# Patient Record
Sex: FEMALE | Employment: FULL TIME | URBAN - METROPOLITAN AREA
[De-identification: names, ages, dates, MRNs, and addresses within clinical notes are randomized per-mention and may not be internally consistent; named-entity substitution may affect disease eponyms.]

---

## 2019-12-10 ENCOUNTER — OFFICE VISIT (OUTPATIENT)
Dept: INTERNAL MEDICINE CLINIC | Facility: CLINIC | Age: 49
End: 2019-12-10
Payer: COMMERCIAL

## 2019-12-10 VITALS
HEART RATE: 72 BPM | DIASTOLIC BLOOD PRESSURE: 68 MMHG | SYSTOLIC BLOOD PRESSURE: 104 MMHG | HEIGHT: 64.75 IN | BODY MASS INDEX: 30.61 KG/M2 | TEMPERATURE: 98 F | RESPIRATION RATE: 12 BRPM | WEIGHT: 181.5 LBS

## 2019-12-10 DIAGNOSIS — R26.89 BALANCE PROBLEM: Primary | ICD-10-CM

## 2019-12-10 DIAGNOSIS — R09.82 POST-NASAL DRIP: ICD-10-CM

## 2019-12-10 DIAGNOSIS — R51.9 HEADACHE, UNSPECIFIED HEADACHE TYPE: ICD-10-CM

## 2019-12-10 DIAGNOSIS — Z00.00 PHYSICAL EXAM, ANNUAL: ICD-10-CM

## 2019-12-10 PROCEDURE — 99204 OFFICE O/P NEW MOD 45 MIN: CPT | Performed by: INTERNAL MEDICINE

## 2019-12-12 ENCOUNTER — TELEPHONE (OUTPATIENT)
Dept: INTERNAL MEDICINE CLINIC | Facility: CLINIC | Age: 49
End: 2019-12-12

## 2019-12-12 DIAGNOSIS — R71.8 LOW MEAN CORPUSCULAR VOLUME (MCV): ICD-10-CM

## 2019-12-12 DIAGNOSIS — D72.829 LEUKOCYTOSIS, UNSPECIFIED TYPE: Primary | ICD-10-CM

## 2019-12-12 NOTE — TELEPHONE ENCOUNTER
Received lab results from [facility] for patient. External result console updated, results placed for Dr. Mikal Finnegan review.

## 2019-12-12 NOTE — TELEPHONE ENCOUNTER
Patient returned call, discussed labs per Dr. George Miller note below. Patient reports feeling slightly better, still with post-nasal drip, but is no longer sneezing or coughing. Patient reports all drainage as clear and denies fever.  Patient advised if she star

## 2019-12-12 NOTE — TELEPHONE ENCOUNTER
Results reviewed. Wbc elevated with neutrophil predominence. Unclear etiology. At visit she did state she was getting over a sinus cold, could be due to that. Chronic sinusitis? Repeat cbc with diff in 1 week.  If any fever or any worsening sinus sympto

## 2019-12-18 NOTE — TELEPHONE ENCOUNTER
Duplicate labs received, dated 12/12/19. Confirmed these have already been reviewed and entered in external results console.

## 2019-12-23 ENCOUNTER — TELEPHONE (OUTPATIENT)
Dept: INTERNAL MEDICINE CLINIC | Facility: CLINIC | Age: 49
End: 2019-12-23

## 2019-12-23 DIAGNOSIS — R71.8 LOW MEAN CORPUSCULAR VOLUME (MCV): ICD-10-CM

## 2019-12-23 DIAGNOSIS — E61.1 LOW IRON: Primary | ICD-10-CM

## 2019-12-23 LAB
FERRITIN: 38 (ref 15–150)
HCT: 37.2 (ref 34–46.6)
HGB: 11.5 (ref 11.1–15.9)
IRON BIND.CAP.(TIBC): 309 UG/DL (ref 250–450)
IRON SATURATION: 24 % (ref 15–55)
IRON: 73 (ref 27–159)
MEAN CELL VOLUME: 76 (ref 79–97)
MEAN CORPUSCULAR HEMOGLOBIN: 23.6 (ref 26.6–33)
MEAN CORPUSCULAR HGB CONC: 30.9 (ref 31.5–35.7)
PLT: 373 (ref 150–450)
RED BLOOD COUNT: 4.87 (ref 3.77–5.28)
RED CELL DISTRIBUTION WIDTH: 17.3 (ref 12.3–15.4)
UIBC: 236 (ref 131–425)
WBC: 10.2 (ref 3.4–10.8)

## 2019-12-23 NOTE — TELEPHONE ENCOUNTER
Received CBC, Iron/TIBC, and ferritin results from [facility]. External results console updated. Ordered by Dr. Rodo Morin to be repeated 1 week from 12/12/19 per TE dated 12/12/19. Placed for Dr. Rock Fuller review as coverage for Dr. Rodo Morin.

## 2019-12-30 NOTE — TELEPHONE ENCOUNTER
Called to speak to patient, unable to leave detailed message per HIPAA. Left message requesting patient call back with holiday office hours.      Need to determine which lab patient would like to have labs drawn: preferred lab listed as [facility], received mos

## 2019-12-30 NOTE — TELEPHONE ENCOUNTER
Reviewed results. Will discuss in detail at her visit but her iron levels are not low. Will need hemoglobin electrophoresis. Please order and inform pt. She should do it this week so we can have results in time for her appt.

## 2020-01-07 ENCOUNTER — LAB ENCOUNTER (OUTPATIENT)
Dept: LAB | Age: 50
End: 2020-01-07
Attending: INTERNAL MEDICINE
Payer: COMMERCIAL

## 2020-01-07 DIAGNOSIS — R71.8 LOW MEAN CORPUSCULAR VOLUME (MCV): ICD-10-CM

## 2020-01-07 PROCEDURE — 36415 COLL VENOUS BLD VENIPUNCTURE: CPT | Performed by: INTERNAL MEDICINE

## 2020-01-07 PROCEDURE — 83021 HEMOGLOBIN CHROMOTOGRAPHY: CPT | Performed by: INTERNAL MEDICINE

## 2020-01-09 LAB
HEMOGLOBIN - OTHER: 0 %
HEMOGLOBIN A2: 5.2 %
HEMOGLOBIN A: 91.5 %
HEMOGLOBIN C: 0 %
HEMOGLOBIN E: 0 %
HEMOGLOBIN F: 3.3 %
HEMOGLOBIN S: 0 %

## 2020-01-10 ENCOUNTER — OFFICE VISIT (OUTPATIENT)
Dept: INTERNAL MEDICINE CLINIC | Facility: CLINIC | Age: 50
End: 2020-01-10
Payer: COMMERCIAL

## 2020-01-10 VITALS
HEART RATE: 72 BPM | TEMPERATURE: 98 F | BODY MASS INDEX: 29.88 KG/M2 | SYSTOLIC BLOOD PRESSURE: 100 MMHG | HEIGHT: 64.75 IN | WEIGHT: 177.19 LBS | RESPIRATION RATE: 12 BRPM | DIASTOLIC BLOOD PRESSURE: 64 MMHG

## 2020-01-10 DIAGNOSIS — Z12.31 ENCOUNTER FOR SCREENING MAMMOGRAM FOR BREAST CANCER: ICD-10-CM

## 2020-01-10 DIAGNOSIS — Z00.00 PHYSICAL EXAM, ANNUAL: Primary | ICD-10-CM

## 2020-01-10 PROCEDURE — 99396 PREV VISIT EST AGE 40-64: CPT | Performed by: INTERNAL MEDICINE

## 2020-01-10 NOTE — PROGRESS NOTES
[facility]    CHIEF COMPLAINT: Patient presents with:  Routine Physical: no gyne. last pap 5 years ago. last mammo 7 years ago.          HPI:   Floresita Lerma is a 51year old female who presents for a complete physical exam. Symptoms: denies di 100/64 (BP Location: Left arm, Patient Position: Sitting, Cuff Size: large)   Pulse 72   Temp 98.1 °F (36.7 °C) (Oral)   Resp 12   Ht 64.75\"   Wt 177 lb 3.2 oz (80.4 kg)   LMP 10/29/2019 (Exact Date)   BMI 29.72 kg/m²   Body mass index is 29.72 kg/m².    G (RZX=[id number]);  Future        Return in about 1 year (around 1/10/2021) for physical.      Zach Hester MD

## 2020-01-10 NOTE — PROGRESS NOTES
Spoke to pt, aware of results & recommendations. Pt voiced understanding.   Ordered & provided referral information below:  Wilman Kat MD  [address]  [phone]

## 2020-01-20 NOTE — PROGRESS NOTES
[facility] [place] [facility] Hematology and Oncology Clinic Note    Visit Diagnosis:  Low mean corpuscular volume (MCV)  (primary encounter diagnosis)  Beta thalassemia trait    History of Present Illness: 49F with no significant PMH referred by Dr. Inez Martino for microcytosis.  She n sq meters (01/21 1051)  Pulse: 81 (01/21 1051)  BP: 113/77 (01/21 1051)  Temp: 97.7 °F (36.5 °C) (01/21 1051)  Do Not Use - Resp Rate: --  SpO2: 99 % (01/21 1051)     General: NAD, AOX3  HEENT: clear op, mmm, no jvd, no scleral icterus  LN: no supraclavicu

## 2020-01-21 ENCOUNTER — OFFICE VISIT (OUTPATIENT)
Dept: HEMATOLOGY/ONCOLOGY | Facility: HOSPITAL | Age: 50
End: 2020-01-21
Attending: INTERNAL MEDICINE
Payer: COMMERCIAL

## 2020-01-21 VITALS
SYSTOLIC BLOOD PRESSURE: 113 MMHG | HEIGHT: 64.75 IN | RESPIRATION RATE: 16 BRPM | HEART RATE: 81 BPM | OXYGEN SATURATION: 99 % | WEIGHT: 179 LBS | TEMPERATURE: 98 F | DIASTOLIC BLOOD PRESSURE: 77 MMHG | BODY MASS INDEX: 30.19 KG/M2

## 2020-01-21 DIAGNOSIS — D56.3 BETA THALASSEMIA TRAIT: ICD-10-CM

## 2020-01-21 DIAGNOSIS — R71.8 LOW MEAN CORPUSCULAR VOLUME (MCV): Primary | ICD-10-CM

## 2020-01-21 PROCEDURE — 99243 OFF/OP CNSLTJ NEW/EST LOW 30: CPT | Performed by: INTERNAL MEDICINE

## 2020-01-21 NOTE — PROGRESS NOTES
Education Record    Learner:  Patient    Disease / Catarina Schlatter labs     Barriers / Limitations:  None   Comments:    Method:  Discussion   Comments:    General Topics:  Plan of care reviewed   Comments:    Outcome:  Shows understanding   Comments:

## 2020-06-03 ENCOUNTER — TELEPHONE (OUTPATIENT)
Dept: INTERNAL MEDICINE CLINIC | Facility: CLINIC | Age: 50
End: 2020-06-03

## 2020-06-03 NOTE — TELEPHONE ENCOUNTER
Spoke to pt. Pt states s/s started about a week ago. Pt states feels short of breath when running up the stairs & wearing N95 mask. Denies shortness of breath at this time. Just with exertion.   Pt did reschedule sooner for Friday, 6/5/2020 at 8:00AM. Statement Selected

## 2020-06-03 NOTE — TELEPHONE ENCOUNTER
Hi Ladies,   Is this OK for apt to wait until 6/8/2020   -Hawa Lewis   ----- Message -----   From: Landon Clarke   Sent: 6/3/2020   1:39 PM CDT   To: [facility] Front Office   Subject: Appointment scheduled from [place] [id number]       Appointment For: Lily Purcell

## 2020-06-03 NOTE — TELEPHONE ENCOUNTER
How long have these symptoms been going on? Any shortness of breath? Conflicting notes below stating no shortness of breath but stating more winded. Recommend visit sooner than Monday.  Can she come in on Friday at 8am?

## 2020-06-03 NOTE — TELEPHONE ENCOUNTER
Spoke to pt. Pt reports lately, heart rate has been in the 90-110s. States her watch has detected abnormal rhythm twice. Pt not performing any strenuous activity at time of detections. States normally in .   Denies chest pain, or neck/jaw/arm pain

## 2020-06-05 ENCOUNTER — TELEPHONE (OUTPATIENT)
Dept: INTERNAL MEDICINE CLINIC | Facility: CLINIC | Age: 50
End: 2020-06-05

## 2020-06-05 NOTE — TELEPHONE ENCOUNTER
PSR called patient. 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     First EKG: jesús

## 2020-06-05 NOTE — TELEPHONE ENCOUNTER
Noted.  [address] message also sent to pt. See separate Patient Message encounter.     FYI to Dr. Cary Slaughter

## 2020-06-05 NOTE — TELEPHONE ENCOUNTER
PSR was on phone with patient before Dr. Aniceto Thornton note. Triage told PSR to schedule TV with Eileen until we knew what to do. Patient IS as of now scheduled for a TV with Eileen at 12:40. PSR kept OV on Tuesday with Dr. Margarita Vázquez.     Patient is a nurse at [employer],

## 2020-06-05 NOTE — TELEPHONE ENCOUNTER
She needs an ov as below. tv is not appropriate, she has cardiac symptoms and a cardiac exam with ekg will need to be done in the office. If she has worsening symptoms over the weekend go to the ER.    Would recommend that if she has access to an ekg parveen

## 2020-06-05 NOTE — TELEPHONE ENCOUNTER
Pt cancelled OV today d/t not being able to get off work. R/s 6/9/2020. Pt had scheduled d/t tachycardia & sob on exertion. Okay to keep 69/2020 OV or see if can do TV now or sooner than OV? Please advise, thanks!

## 2020-06-05 NOTE — TELEPHONE ENCOUNTER
PSR - See note below    Please schedule for earliest OV, will require EKG. Pt to go to ER if symptoms worsen over weekend.

## 2020-06-05 NOTE — TELEPHONE ENCOUNTER
Pt cancelled her Physical for today due to not being able to get off work. Then Pt made a [employer] jennifer for T[place] for an exam. The explanation for the visit is abnormal heart rate on her watch. So sending you this for that reason.

## 2020-06-09 ENCOUNTER — OFFICE VISIT (OUTPATIENT)
Dept: INTERNAL MEDICINE CLINIC | Facility: CLINIC | Age: 50
End: 2020-06-09
Payer: COMMERCIAL

## 2020-06-09 ENCOUNTER — LAB ENCOUNTER (OUTPATIENT)
Dept: LAB | Age: 50
End: 2020-06-09
Attending: INTERNAL MEDICINE
Payer: COMMERCIAL

## 2020-06-09 VITALS
RESPIRATION RATE: 12 BRPM | HEIGHT: 64.75 IN | BODY MASS INDEX: 30.83 KG/M2 | WEIGHT: 182.81 LBS | DIASTOLIC BLOOD PRESSURE: 70 MMHG | TEMPERATURE: 98 F | SYSTOLIC BLOOD PRESSURE: 98 MMHG | HEART RATE: 72 BPM

## 2020-06-09 DIAGNOSIS — R00.9 ABNORMAL HEART RATE: Primary | ICD-10-CM

## 2020-06-09 DIAGNOSIS — R00.9 ABNORMAL HEART RATE: ICD-10-CM

## 2020-06-09 PROCEDURE — 36415 COLL VENOUS BLD VENIPUNCTURE: CPT | Performed by: INTERNAL MEDICINE

## 2020-06-09 PROCEDURE — 82607 VITAMIN B-12: CPT | Performed by: INTERNAL MEDICINE

## 2020-06-09 PROCEDURE — 99214 OFFICE O/P EST MOD 30 MIN: CPT | Performed by: INTERNAL MEDICINE

## 2020-06-09 PROCEDURE — 80050 GENERAL HEALTH PANEL: CPT | Performed by: INTERNAL MEDICINE

## 2020-06-09 PROCEDURE — 93000 ELECTROCARDIOGRAM COMPLETE: CPT | Performed by: INTERNAL MEDICINE

## 2020-06-09 PROCEDURE — 82746 ASSAY OF FOLIC ACID SERUM: CPT | Performed by: INTERNAL MEDICINE

## 2020-06-09 NOTE — PROGRESS NOTES
[address] [facility]    CHIEF COMPLAINT:  Patient presents with:  Irregular Heart Beat: pt's watch has picked up abnormal heart rate dected twice in the past 2 weeks. Denies any chest pain, sob N/V abnormal sweating, no palpitation/fluttering.  no pap. last QUANT(KNOWN)   Result Value Ref Range    Hemoglobin A 91.5 (L) 95.0 - 97.9 %    Hemoglobin A2 5.2 (H) 2.0 - 3.5 %    Hemoglobin C 0.0 0.0 - 0.0 %    Hemoglobin E 0.0 0.0 - 0.0 %    Hemoglobin F 3.3 (H) 0.0 - 2.1 %    Hemoglobin - Other 0.0 0.0 - 0.0 %    H

## 2020-06-17 ENCOUNTER — HOSPITAL ENCOUNTER (OUTPATIENT)
Dept: CV DIAGNOSTICS | Facility: HOSPITAL | Age: 50
Discharge: HOME | End: 2020-06-17
Attending: INTERNAL MEDICINE
Payer: COMMERCIAL

## 2020-06-17 DIAGNOSIS — R00.9 ABNORMAL HEART RATE: ICD-10-CM

## 2020-06-17 PROCEDURE — 93306 TTE W/DOPPLER COMPLETE: CPT | Performed by: INTERNAL MEDICINE

## 2020-06-17 PROCEDURE — 93225 XTRNL ECG REC<48 HRS REC: CPT | Performed by: INTERNAL MEDICINE

## 2020-06-17 PROCEDURE — 93226 XTRNL ECG REC<48 HR SCAN A/R: CPT | Performed by: INTERNAL MEDICINE

## 2020-06-17 PROCEDURE — 93227 XTRNL ECG REC<48 HR R&I: CPT | Performed by: INTERNAL MEDICINE

## 2021-11-03 ENCOUNTER — IMMUNIZATION (OUTPATIENT)
Dept: LAB | Facility: HOSPITAL | Age: 51
End: 2021-11-03
Attending: EMERGENCY MEDICINE
Payer: COMMERCIAL

## 2021-11-03 DIAGNOSIS — Z23 NEED FOR VACCINATION: Primary | ICD-10-CM

## 2021-11-03 PROCEDURE — 0001A SARSCOV2 VAC 30MCG/0.3ML IM: CPT

## 2021-12-01 ENCOUNTER — IMMUNIZATION (OUTPATIENT)
Dept: LAB | Facility: HOSPITAL | Age: 51
End: 2021-12-01
Attending: EMERGENCY MEDICINE
Payer: COMMERCIAL

## 2021-12-01 DIAGNOSIS — Z23 NEED FOR VACCINATION: Primary | ICD-10-CM

## 2021-12-01 PROCEDURE — 0002A SARSCOV2 VAC 30MCG/0.3ML IM: CPT

## 2022-05-19 ENCOUNTER — PATIENT OUTREACH (OUTPATIENT)
Dept: INTERNAL MEDICINE CLINIC | Facility: CLINIC | Age: 52
End: 2022-05-19

## 2022-05-24 NOTE — PROGRESS NOTES
Quality, sent letter to pt on 5/19/22 reminding pt to schedule appointments for PE, Mammo, PAP and Colon Screening.

## 2022-09-09 ENCOUNTER — OFFICE VISIT (OUTPATIENT)
Dept: FAMILY MEDICINE CLINIC | Facility: CLINIC | Age: 52
End: 2022-09-09
Payer: COMMERCIAL

## 2022-09-09 VITALS
BODY MASS INDEX: 30.32 KG/M2 | HEART RATE: 88 BPM | DIASTOLIC BLOOD PRESSURE: 64 MMHG | SYSTOLIC BLOOD PRESSURE: 100 MMHG | OXYGEN SATURATION: 99 % | HEIGHT: 65 IN | RESPIRATION RATE: 16 BRPM | TEMPERATURE: 98 F | WEIGHT: 182 LBS

## 2022-09-09 DIAGNOSIS — S61.309A AVULSION OF FINGERNAIL, INITIAL ENCOUNTER: Primary | ICD-10-CM

## 2022-09-09 PROCEDURE — 99213 OFFICE O/P EST LOW 20 MIN: CPT | Performed by: PHYSICIAN ASSISTANT

## 2022-09-09 PROCEDURE — 3074F SYST BP LT 130 MM HG: CPT | Performed by: PHYSICIAN ASSISTANT

## 2022-09-09 PROCEDURE — 3008F BODY MASS INDEX DOCD: CPT | Performed by: PHYSICIAN ASSISTANT

## 2022-09-09 PROCEDURE — 3078F DIAST BP <80 MM HG: CPT | Performed by: PHYSICIAN ASSISTANT

## 2023-09-06 ENCOUNTER — OFFICE VISIT (OUTPATIENT)
Dept: INTERNAL MEDICINE CLINIC | Facility: CLINIC | Age: 53
End: 2023-09-06
Payer: COMMERCIAL

## 2023-09-06 VITALS
DIASTOLIC BLOOD PRESSURE: 78 MMHG | RESPIRATION RATE: 20 BRPM | BODY MASS INDEX: 30.76 KG/M2 | OXYGEN SATURATION: 98 % | HEIGHT: 65 IN | TEMPERATURE: 98 F | SYSTOLIC BLOOD PRESSURE: 112 MMHG | WEIGHT: 184.63 LBS | HEART RATE: 84 BPM

## 2023-09-06 DIAGNOSIS — S46.911A MUSCLE STRAIN OF RIGHT SHOULDER, INITIAL ENCOUNTER: Primary | ICD-10-CM

## 2023-09-06 PROCEDURE — 99213 OFFICE O/P EST LOW 20 MIN: CPT | Performed by: STUDENT IN AN ORGANIZED HEALTH CARE EDUCATION/TRAINING PROGRAM

## 2023-09-06 PROCEDURE — 3008F BODY MASS INDEX DOCD: CPT | Performed by: STUDENT IN AN ORGANIZED HEALTH CARE EDUCATION/TRAINING PROGRAM

## 2023-09-06 PROCEDURE — 3078F DIAST BP <80 MM HG: CPT | Performed by: STUDENT IN AN ORGANIZED HEALTH CARE EDUCATION/TRAINING PROGRAM

## 2023-09-06 PROCEDURE — 3074F SYST BP LT 130 MM HG: CPT | Performed by: STUDENT IN AN ORGANIZED HEALTH CARE EDUCATION/TRAINING PROGRAM

## 2023-09-06 NOTE — PATIENT INSTRUCTIONS
- Please take Ibuprofen 600 mg two times a day for 2 weeks    - Please schedule an appointment with physical therapy    - Please return to the clinic if your symptoms do not improve or get worse    - Please schedule an appointment for your annual physical exam

## 2023-10-12 ENCOUNTER — TELEPHONE (OUTPATIENT)
Dept: PHYSICAL THERAPY | Facility: HOSPITAL | Age: 53
End: 2023-10-12

## 2023-10-16 ENCOUNTER — APPOINTMENT (OUTPATIENT)
Dept: PHYSICAL THERAPY | Age: 53
End: 2023-10-16
Attending: STUDENT IN AN ORGANIZED HEALTH CARE EDUCATION/TRAINING PROGRAM
Payer: COMMERCIAL

## 2023-10-18 ENCOUNTER — OFFICE VISIT (OUTPATIENT)
Dept: PHYSICAL THERAPY | Age: 53
End: 2023-10-18
Attending: STUDENT IN AN ORGANIZED HEALTH CARE EDUCATION/TRAINING PROGRAM
Payer: COMMERCIAL

## 2023-10-18 DIAGNOSIS — S46.911A MUSCLE STRAIN OF RIGHT SHOULDER, INITIAL ENCOUNTER: Primary | ICD-10-CM

## 2023-10-18 PROCEDURE — 97110 THERAPEUTIC EXERCISES: CPT

## 2023-10-18 PROCEDURE — 97140 MANUAL THERAPY 1/> REGIONS: CPT

## 2023-10-18 PROCEDURE — 97161 PT EVAL LOW COMPLEX 20 MIN: CPT

## 2023-10-18 NOTE — PATIENT INSTRUCTIONS
Thank you for coming to your physical therapy appt! During your appt today you were issued a HEP handout from [employer] which can also be accessed using the information below. The exercises chosen are meant to supplement the treatment you received and reinforce the progress made in physical therapy. It is important to stay consistent with your exercises to help facilitate and maximize your recovery! View at [email or web address] [employer] using code: [email or web address]

## 2023-10-18 NOTE — PROGRESS NOTES
PHYSICAL THERAPY INITIAL EVALUATION     Date of service: 10/16/2023  Dx: Muscle strain of right shoulder, subsequent encounter (F62.401V)     Insurance: [employer] LABOR FUND PPO  Insurance Limits: N/A  Visit #: 1  Authorized # of Visits: N/A  POC/Auth Expiration: N/A  Authorizing Physician/Provider: Emily      PATIENT SUMMARY      History/LETY: \"I don't know. It's been going on for a while. I know that it's definitely been over 6 months. I remember I was going to the gym and I noticed that with me not going to the gym has helped. If I sleep on it, certain times, it's more irritated. When my  massages it, sometimes it can hurt worse. I went to the doctor and I have full range but if I move it a certain way, it feels sharp. \" The patient denies any pain travelling down the arm. She reports that she went to a stretch club and they did gua sha and the person she saw felt a lot of scar tissue. She reports that she bruises easily and didn't go more than 3 times. She has also used a massage gun on it, but can still bruise from that. The patient reports that she had difficulty putting her right arm on the middle console while driving, but it's been better and she can do it now. \"I went to the doctor and she wanted me to take ibuprofen 600mg for two weeks. I did it for four days and my stomach hurt so bad. 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 \"The doctor didn't feel that it was a joint issue, but felt it was a muscular issue. I asked about imaging because my initial thought is that there's a tear. \"     The patient has a membership and massage [employer] \"and they always say that my right shoulder is always tight. \"      DOI/S: chronic, insidious onset      Aggravating Factors: reaching behind back, lying on right side, abduction causes \"tightness\", wearing tight clothing/compression clothing     Alleviating Factors: resting, avoid aggravating activities, topical creams      PMH: The patient's PMH was reviewed with the patient including allergies, medications, and surgical and medical history. Patient has no past medical history on file. The patient denies any previous shoulder injuries. The patient denies any other relevant orthopedic history. PLF/Personal Goals: The patient would like to be able to resume recreational workouts including resistance training. \"I feel like I can do them, I just felt like I needed some time off. I know that I can do my workout, but I don't want to do it and then do more harm then good. 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 Pain at rest: 0/10  Pain at worst: 2/10     Outcomes Measure(s):   QuickDASH: 4.55 % disability     Activity Measures:  Sleeping: No Activity Limitation: Patient is able to sleep without interruptions due to shoulder pain. Sitting: No Activity Limitation: Patient is able to sit without limitations. Deskwork/Computer Work: No Activity Limitation: Patient is able to complete deskwork without limitations. Reaching Laterally: Mild Activity Limitation: Patient is able to reach laterally, mild pain with elbow bent. Reaching Across: Mild Activity Limitation: Patient is with some end-range pain with reaching across. Reaching Overhead: Mild Activity Limitation: Patient is with some end-range pain with reaching overhead. Lying on Involved Side: Mild Activity Limitation: Patient is with pain after lying on her involved side while sleeping at night. Overhead Work: Mild Activity Limitation: Patient is with some end-range pain for overhead reaching, limiting prolonged overhead work. Inspection/Observation: Patient demonstrates postural deficits with forward head, protracted shoulders, and anteriorly seated humeral head.    Palpation: Patient demonstrates mild TTP over the biceps tendon. Sensation: Patient is with intact sensation. ROM:          Shoulder Motion PROM AROM    Right Left Right Left   Shoulder Flexion N/A N/A 165* 165   Shoulder Abduction N/A N/A 165* 165   Shoulder Ext Rotation (at 45deg ABD) [facility] N/A N/A   Shoulder Int Rotation (at 45deg ABD) [facility] N/A N/A   *indicates activity was associated with pain      Strength/MMT:        Shoulder Motion Strength    Right Left   Shoulder Flexion 4+/5 5/5   Shoulder Abduction 4+/5 5/5   Shoulder ER (at side) 5/5 5/5   Shoulder IR (at side) 5/5 5/5   *indicates activity was associated with pain     Special Tests:   Shoulder Special Tests:  HEDY:   Neer: (R) (-), (L) (-)  Hawkin's Carlos: (R) (-), (L) (-)  Mazion Shoulder Maneuver (FMS): (R) (-), (L) (-)  Collins's Active Compression (thumb down): (R) (+), (L) (-)  Abe Test/Empty Can: (R) (+), (L) (-)  Shoulder Shrug Sign: (R) (-), (L) (-)  Rotator Cuff:   Painful Arc Sign: (R) (-), (L) (-)  Drop Arm Sign (full thickness): (R) (-), (L) (-)  Abe Test/Empty Can (supra): (R) (+), (L) (-)  ER Lag Sign 45deg ABD (infra): (R) (-), (L) (-)  SLAP:   Collins's Active Compression (thumb down): (R) (+), (L) (-)  Clunk/Crank Test (scour): (R) (+), (L) (-)  Shoulder Instability:   Anterior Apprehension Test: (R) (-), (L) (-)  Abe Relocation Test: (R) (-), (L) (-)      ASSESSMENT:      Lynda Ramey is a 47year old female that presents to physical therapy evaluation with signs and symptoms consistent with mild right shoulder secondary impingement syndrome and possible bicipital tendinitis. The patient reports chronic, insidious onset of right shoulder pain that has forced her to stop her resistance training workouts. The patient was with some mild TTP over the biceps tendon and reports pain and \"tightness\" over the anterior aspect of her shoulder.  She demonstrates some postural mobility and strength deficits as well as scapular stabilization strength deficits. The patient demonstrates no apparent loss of strength at her right shoulder. Current functional limitation include but are not limited to reaching overhead, reaching across, reaching laterally with her arm bent, and pushing/pulling/lifting heavy objects for general ruth/fitness activities. The patient works as a nurse. The patient would benefit from physical therapy to facilitate improved [address] [place] [address] mobility and mechanics for full return to [facility] and general health/fitness workouts. Precautions/WB Status: WBAT  Education or Treatment Limitation(s): None  Rehab Potential: Good     TREATMENT:      Initial Evaluation: x 20min      Therapeutic Exercise: x 15min  Elastic band pull apart (seated): 2 x 10 (B), green theraband  Elastic band (B) ER: 2 x 10 (B), green theraband   Modified downward anitha: 1 x 10 x 3\"   Patient Education: Patient was educated on anatomy and pathophysiology of current condition, rationale for physical therapy, anticipated treatment interventions, prognosis, timeline for recovery, and expected functional outcomes based on evaluative findings. Patient was educated on the importance of compliance with consistent treatment and HEP to achieve mutually established goals. Administered HEP: Issued and reviewed HEP handout, exercise selection, and recommended resistance. Provided verbal and written instructions/cueing for proper technique and common errors/compensations as needed. Manual Therapy: x 10min  Trigger point release (supine): (R) biceps, teres, lats  GH posterior joint mobs: Grade 3, 4 x 10\" (R)   GH inferior joint mobs: Grade 3, 4 x 10\" (R)      Home Exercise Program: Patient was issued a HEP handout 10/16/2023 including elastic band pull apart, elastic band (B) ER, and modified downward dog. Provider Interactions With Patient:   Patient education was provided as described above.  All patient's questions were answered and the patient denied further comments, complaints, or concerns upon departure. Patient was issued an appt list and verbally confirmed the next appt date and time to ensure consistency with physical therapy attendance. Charges: PT Eval Low Complexity Complexity x 1, MT x 1, Therex x 1  Total Timed Treatment: 25min       Total Treatment Time: 45min        PLAN OF CARE:       Goals:  Short-Term Goals:  Patient will improve thoracic spine extension and rotation mobility to facilitate full overhead reaching mobility for overhead reaching. Timeframe: 3 weeks. Long-Term Goals: The patient will improve combined shoulder internal rotation, extension, and adduction AROM with scapular retraction and thoracic extension mobility to facilitate reaching behind the back to T8 level for self-care and dressing activities. Timeframe: 4-6 weeks. Patient will improve shoulder pain and mobility to facilitate periodically lying on the involved side while sleeping without complaints of pain or stiffness upon waking. Timeframe: 4-6 weeks. Patient will improve shoulder mobility to facilitate intermittent reaching across his/her body for regularly for self-care and dressing activities. Timeframe: 4-6 weeks. The patient will improve shoulder mobility and scapular stabilization strength to facilitate return to recreational resistance training for general health/fitness activities. Timeframe: 6 weeks. Plan Frequency / Duration: Patient will be seen for 2x/week for 4 weeks, for a total of 8 visits, over a 90 day period. We will re-evaluate the patient at that time in order to determine functional progress, evaluate short-term goal completion, and establish an updated plan of care. Possible treatment interventions will/may include: Therapeutic Activities, Therapeutic Exercise, Neuromuscular Re-education, Manual Therapy, Home Exercise Program Instruction, Patient Education, Self-Care/Home Management, and Modalities as needed.      Patient/Family was advised of these findings, precautions, and treatment options and has agreed to actively participate in planning and for this course of care. Thank you for your referral. Please co-sign or sign and return this letter via fax as soon as possible to [phone]. If you have any questions, please contact me at Dept: [phone]. Sincerely,     X___Mili Pinzon, PT, DPT, SCS, ATC, CSCS____ Date: ___10/16/2023_____     Electronically signed by therapist: Tony More PT  [de-identified] certification required: Yes  I certify the need for these services furnished under this plan of treatment and while under my care.

## 2023-10-23 ENCOUNTER — OFFICE VISIT (OUTPATIENT)
Dept: PHYSICAL THERAPY | Age: 53
End: 2023-10-23
Attending: STUDENT IN AN ORGANIZED HEALTH CARE EDUCATION/TRAINING PROGRAM
Payer: COMMERCIAL

## 2023-10-23 PROCEDURE — 97110 THERAPEUTIC EXERCISES: CPT

## 2023-10-23 PROCEDURE — 97140 MANUAL THERAPY 1/> REGIONS: CPT

## 2023-10-23 NOTE — PROGRESS NOTES
Date of Service: 10/23/2023  Dx: Muscle strain of right shoulder, subsequent encounter (E39.199K)              Insurance: [employer] LABOR FUND PPO  Insurance Limits: N/A  Visit #: 2  Authorized # of Visits: N/A  POC/Auth Expiration: N/A  Date of Last PN: 10/16/23 (Visit #1)  Authorizing Physician/Provider: Yamileth Marley MD visit: 10/23/23  Fall Risk: Standard         Precautions: None            Subjective: \"It's snot bad actually. I was doing the exercises. I did notice that I felt more of the tightness in the front of the shoulder with the external rotation band exercises. I'm a side sleeper and sleep on my right shoulder. If I turn and try to move my arm, I will have to shift and put it in a different position, and I've noticed that I've been able to do with more ease. \" She reports that she hasn't been taking any medication. \"I haven't been back to the gym. \"     Objective:     Pain/Symptom Presentation: The patient described that pain as more of a \"discomfort. \"  Pain at rest: 0/10  Pain at worst: 2/10    HEP: Patient was issued a HEP handout 10/23/2023 including S/L ER, HABD, and flexion, elastic band pull apart, elastic band (B) ER, and modified downward dog. Treatment:    Therapeutic Exercise: x 23min  Elastic band pull apart (seated): 2 x 10 (B), green theraband  Elastic band (B) ER: 2 x 10 (B), green theraband   Modified downward anitha: 1 x 10 x 3\"   S/L ER: 2 x 10 (R), 2# DB  S/L ABD: attemtped but d/c due to clicking in shoulder  S/L HABD: 2 x 10 (R), 2# DB  S/L flexion: 2 x 10 (R), 2# DB  Doorway 90-90 pec stretch: 2 x 30\" (B)   Elastic band low rows: 2 x 10 (B), green theraband --> progress next session  Elastic band (B) shoulder ext: 2 x 10 (B), green theraband   Elastic band face pulls: 2 x 10 (B), red theraband   HEP update: Reviewed new HEP handout with new exercises and progressions, provided verbal and written instructions/cueing for proper technique and common errors/compensations as needed. Reviewed the importance of compliance with home exercise program to facilitate recovery and meet long-term goals. Neuromuscular Re-education: x 2min  Rhythmic stabilization - supine with arm at 90deg flex: 2 x 30\" (R)        Manual Therapy: x 15min  Trigger point release (supine): (R) biceps, teres, lats  GH posterior joint mobs: Grade 3, 4 x 10\" (R)   GH inferior joint mobs: Grade 3, 4 x 10\" (R)   Trigger point release: (R) biceps  Sustained posterior glide with ER MWM: x 10 (R)   Sustained inferior glide with flexion MWM: x 10 (R)     Provider Interactions With Patient:   Added therapeutic exercises as documented, with cueing provided throughout performance to ensure correct technique during exercise. Assessment: Demarco Atkins reports some small improvements even after her first appt and with compliance with her HEP. She reports some improved ability to reach as well as lifting her arm onto the middle console while driving. She was with some localized TTP over the right biceps this date. The patient was taken through additional exercises progressions for shoulder and scapular stabilization strengthening. The patient was issued an updated HEP handout to reflect recent progressions in her exercise regimen. We will continue to progress the patient's strength as tolerated. Goals:   Short-Term Goals:  Patient will improve thoracic spine extension and rotation mobility to facilitate full overhead reaching mobility for overhead reaching. Timeframe: 3 weeks. Long-Term Goals: The patient will improve combined shoulder internal rotation, extension, and adduction AROM with scapular retraction and thoracic extension mobility to facilitate reaching behind the back to T8 level for self-care and dressing activities. Timeframe: 4-6 weeks. Patient will improve shoulder pain and mobility to facilitate periodically lying on the involved side while sleeping without complaints of pain or stiffness upon waking.  Timeframe: 4-6 weeks. Patient will improve shoulder mobility to facilitate intermittent reaching across his/her body for regularly for self-care and dressing activities. Timeframe: 4-6 weeks. The patient will improve shoulder mobility and scapular stabilization strength to facilitate return to recreational resistance training for general health/fitness activities. Timeframe: 6 weeks. Plan: Follow up on tolerance to update HEP. Charges:  Therex x 2, MT x 1         Total Timed Treatment: 40min    Total Treatment Time: 40min

## 2023-10-23 NOTE — PATIENT INSTRUCTIONS
Thank you for coming to your physical therapy appt! During your appt today you were issued a HEP handout from [employer] which can also be accessed using the information below. The exercises chosen are meant to supplement the treatment you received and reinforce the progress made in physical therapy. It is important to stay consistent with your exercises to help facilitate and maximize your recovery! View at [email or web address] using code: [email or web address]

## 2023-10-25 ENCOUNTER — APPOINTMENT (OUTPATIENT)
Dept: PHYSICAL THERAPY | Age: 53
End: 2023-10-25
Attending: STUDENT IN AN ORGANIZED HEALTH CARE EDUCATION/TRAINING PROGRAM
Payer: COMMERCIAL

## 2023-10-30 ENCOUNTER — APPOINTMENT (OUTPATIENT)
Dept: PHYSICAL THERAPY | Age: 53
End: 2023-10-30
Attending: STUDENT IN AN ORGANIZED HEALTH CARE EDUCATION/TRAINING PROGRAM
Payer: COMMERCIAL

## 2023-11-02 ENCOUNTER — TELEPHONE (OUTPATIENT)
Dept: PHYSICAL THERAPY | Facility: HOSPITAL | Age: 53
End: 2023-11-02

## 2023-11-17 ENCOUNTER — OFFICE VISIT (OUTPATIENT)
Dept: PHYSICAL THERAPY | Age: 53
End: 2023-11-17
Attending: INTERNAL MEDICINE
Payer: COMMERCIAL

## 2023-11-17 PROCEDURE — 97140 MANUAL THERAPY 1/> REGIONS: CPT

## 2023-11-17 PROCEDURE — 97110 THERAPEUTIC EXERCISES: CPT

## 2023-11-17 NOTE — PATIENT INSTRUCTIONS
Thank you for coming to your physical therapy appt! During your appt today you were issued a HEP handout from [employer] which can also be accessed using the information below. The exercises chosen are meant to supplement the treatment you received and reinforce the progress made in physical therapy. It is important to stay consistent with your exercises to help facilitate and maximize your recovery! View at [email or web address] using code: [email or web address]

## 2023-11-17 NOTE — PROGRESS NOTES
Date of Service: 11/17/2023  Dx: Muscle strain of right shoulder, subsequent encounter (M43.555M)              Insurance: [employer] LABOR FUND PPO  Insurance Limits: N/A  Visit #: 3  Authorized # of Visits: N/A  POC/Auth Expiration: N/A  Date of Last PN: 10/16/23 (Visit #1)  Authorizing Physician/Provider: No ref. provider found   Next MD visit: 10/23/23  Fall Risk: Standard         Precautions: None            Subjective: \"I found out that there's some soreness around the shoulder blade. My  tried working that out and he was able to reproduce that pain if I put my arm back on the console. It helped but then I went to work and everyone was a bit heavier that day, so I was pushing them or pulling them. It's more sore, not pain, just sore. The patient reports which bra she has on can affect how much popping she has. It's not painful when it pops, but it's annoying. She reports compliance with her HEP. She reports difficulty reaching across. Objective:     Pain/Symptom Presentation: The patient reports a \"c\" shape to her pain over the top of her shoulder. Pain at rest: 0/10  Pain at worst: 2/10    HEP: Patient was issued a HEP handout 10/23/2023 including S/L ER, HABD, and flexion, elastic band pull apart, elastic band (B) ER, and modified downward dog.      Treatment:    Therapeutic Exercise: x 18min  Doorway 90-90 pec stretch: 2 x 30\" (B)   Cable/pulley low rows: 2 x 10 (B), 7.5#  Cable/pulley (B) shoulder ext: 2 x 10 (B), 7.5#   Elastic band face pulls: 2 x 10 (B), red theraband   Half-kneeling thoracic rotation - 2-way: 1 x 10 (B)   Patient education: self-massage/TPR with lacrosse ball to posterior shoulder and parascapular musculature     Neuromuscular Re-education: x 5min  Rhythmic stabilization - supine with arm at 90deg flex: 2 x 30\" (R)     KB arm bar: 2 x 15 (R), 10# KB      Manual Therapy: x 17min  Soft tissue mobilization (supine): (R) biceps, teres, lats  GH posterior joint mobs: Grade 3, 4 x 10\" (R)   GH inferior joint mobs: Grade 3, 4 x 10\" (R)   Trigger point release: (R) biceps  Sustained posterior glide with ER MWM: x 10 (R)   Soft tissue mobilization (left sidelying): (R) supra, infra, mid and low trap, paraspinals    Provider Interactions With Patient:   Added therapeutic exercises as documented, with cueing provided throughout performance to ensure correct technique during exercise. Assessment: Kori Donald reports that after her  massages her posterior shoulder and scapular area, she is with improved pain management. We spent additional time working this area this session and the patient was instructed in self-massage to that area with a lacrosse ball, which the patient has at home. We also instructed the patient in thoracic spine mobility exercises and added to HEP. We will continue to progress the patient as tolerated. Goals:   Short-Term Goals:  Patient will improve thoracic spine extension and rotation mobility to facilitate full overhead reaching mobility for overhead reaching. Timeframe: 3 weeks. Long-Term Goals: The patient will improve combined shoulder internal rotation, extension, and adduction AROM with scapular retraction and thoracic extension mobility to facilitate reaching behind the back to T8 level for self-care and dressing activities. Timeframe: 4-6 weeks. Patient will improve shoulder pain and mobility to facilitate periodically lying on the involved side while sleeping without complaints of pain or stiffness upon waking. Timeframe: 4-6 weeks. Patient will improve shoulder mobility to facilitate intermittent reaching across his/her body for regularly for self-care and dressing activities. Timeframe: 4-6 weeks. The patient will improve shoulder mobility and scapular stabilization strength to facilitate return to recreational resistance training for general health/fitness activities. Timeframe: 6 weeks. Plan: Follow up on tolerance to update HEP. Charges:  Therex x 2, MT x 1         Total Timed Treatment: 40min    Total Treatment Time: 40min

## 2023-11-20 ENCOUNTER — OFFICE VISIT (OUTPATIENT)
Dept: PHYSICAL THERAPY | Age: 53
End: 2023-11-20
Attending: INTERNAL MEDICINE
Payer: COMMERCIAL

## 2023-11-20 PROCEDURE — 97140 MANUAL THERAPY 1/> REGIONS: CPT

## 2023-11-20 PROCEDURE — 97112 NEUROMUSCULAR REEDUCATION: CPT

## 2023-11-20 PROCEDURE — 97110 THERAPEUTIC EXERCISES: CPT

## 2023-11-20 NOTE — PROGRESS NOTES
Date of Service: 11/20/2023  Dx: Muscle strain of right shoulder, subsequent encounter (R85.830V)              Insurance: [employer] LABOR FUND PPO  Insurance Limits: N/A  Visit #: 4  Authorized # of Visits: N/A  POC/Auth Expiration: N/A  Date of Last PN: 10/16/23 (Visit #1)  Authorizing Physician/Provider: David Kaba MD  Next MD visit: None Scheduled  Fall Risk: Standard         Precautions: None            Subjective: The patient reports that she is tired today due to increased work activity yesterday. \"My shoulder is not bothering me but I'm a side sleeper so I sleep on my right side. I feel like when I wake up in the morning from that, it's a little stiff, but nothing hurts. \"     The patient reports that she hit her elbow \"the other day\" and she felt a sharp pain in her shoulder, \"but nothing now. \"     The patient reports good tolerance to her HEP. She reports that when she goes for massages, the massage therapist comments on how tight her shoulder is. \"If I'm doing any activities, the pain is in the front of the shoulder. \" She reports that she tried the self-massage over the weekend and it did help. Objective:     Pain/Symptom Presentation: The patient reports more pain over the anterior shoulder, over the biceps area. \"It feels like whatever it is is starting in the back of the shoulder and then wraps around the front of the shoulder. \" She reports occasionally the pain will cause headaches on her right side. Pain at rest: 0/10  Pain at worst: 3-4/10 \"with movement,\" but improved with additional movement and use    HEP: Patient was issued a HEP handout 11/17/23 including self-massage with TPR to shoulder, 2-way half-kneeling thoracic rotation, to be added to HEP issed 10/23/2023 including S/L ER, HABD, and flexion, elastic band pull apart, elastic band (B) ER, and modified downward dog.      Treatment:    Therapeutic Exercise: x 15min  Cable/pulley (B) shoulder ext: 2 x 10 (B), 7.5#   Cable/pulley (B) lat pull down: 2 x 10 (B), 10#   Cable/pulley low rows: 2 x 10 (B), 7.5#  Elastic band face pulls: 2 x 10 (B), red theraband     Neuromuscular Re-education: x 15min  Rhythmic stabilization - supine with arm at 90deg flex: 2 x 30\" (R)     KB arm bar: 2 x 15 (R), 10# KB   Elastic band (B) ER with OH press: 2 x 8 (B), yellow theraband   Betty elastic band pull apart with forward flexion: 2 x 10 (B), green theraband   Elastic band lateral UE wall walk: 1 laps  x 15ft, yellow theraband      Manual Therapy: x 15min  Soft tissue mobilization (supine): (R) biceps, teres, lats  GH posterior joint mobs: Grade 3, 4 x 10\" (R)   GH inferior joint mobs: Grade 3, 4 x 10\" (R)   Trigger point release: (R) biceps  Sustained posterior glide with ER MWM: x 10 (R)   Soft tissue mobilization (left sidelying): (R) supra, infra, mid and low trap, paraspinals    Provider Interactions With Patient:   Verbal and manual cueing on proper performance of the prescribed exercises. Assessment: Clair Maldonado reports some mild remaining symptoms including pain over the posterior and anterior GH joint line as well as in the biceps area. She reports some complaints of stiffness that improve with use and while she is able to lie on her involved side while sleeping, she does have some stiffness upon waking in the morning. The patient was able to progress her UE strengthening exercises this date. She reports that she would like to be able to resume general resistance training exercises, including lat pull downs and rows, which we have started in session. The patient was added to wait list for an appt for next week. Goals:   Short-Term Goals:  Patient will improve thoracic spine extension and rotation mobility to facilitate full overhead reaching mobility for overhead reaching. Timeframe: 3 weeks. Long-Term Goals:   The patient will improve combined shoulder internal rotation, extension, and adduction AROM with scapular retraction and thoracic extension mobility to facilitate reaching behind the back to T8 level for self-care and dressing activities. Timeframe: 4-6 weeks. Patient will improve shoulder pain and mobility to facilitate periodically lying on the involved side while sleeping without complaints of pain or stiffness upon waking. Timeframe: 4-6 weeks. Patient will improve shoulder mobility to facilitate intermittent reaching across his/her body for regularly for self-care and dressing activities. Timeframe: 4-6 weeks. The patient will improve shoulder mobility and scapular stabilization strength to facilitate return to recreational resistance training for general health/fitness activities. Timeframe: 6 weeks. Plan: Update HEP next session if no adverse reaction. Charges:  Therex x 1, MT x 1, NMR x 1       Total Timed Treatment: 45min    Total Treatment Time: 45min

## 2023-12-01 ENCOUNTER — OFFICE VISIT (OUTPATIENT)
Dept: PHYSICAL THERAPY | Age: 53
End: 2023-12-01
Attending: INTERNAL MEDICINE
Payer: COMMERCIAL

## 2023-12-01 PROCEDURE — 97112 NEUROMUSCULAR REEDUCATION: CPT

## 2023-12-01 PROCEDURE — 97110 THERAPEUTIC EXERCISES: CPT

## 2023-12-01 PROCEDURE — 97140 MANUAL THERAPY 1/> REGIONS: CPT

## 2023-12-01 NOTE — PATIENT INSTRUCTIONS
Thank you for coming to your physical therapy appt! During your appt today you were issued a HEP handout from [employer] which can also be accessed using the information below. The exercises chosen are meant to supplement the treatment you received and reinforce the progress made in physical therapy. It is important to stay consistent with your exercises to help facilitate and maximize your recovery! View at [email or web address] using code: [place]

## 2023-12-01 NOTE — PROGRESS NOTES
Date of Service: 12/1/2023  Dx: Muscle strain of right shoulder, subsequent encounter (S46.911D)              Insurance: [employer] LABOR FUND PPO  Insurance Limits: N/A  Visit #: 5  Authorized # of Visits: N/A  POC/Auth Expiration: N/A  Date of Last PN: 10/16/23 (Visit #1)  Authorizing Physician/Provider: John Mcghee MD  Next MD visit: None Scheduled  Fall Risk: Standard         Precautions: None            Subjective: \"I think it was a couple of days ago, it was tight and after our session but then I was just massaging it. It wasn't up higher, it was lower down. I went over a spot and it was sharp where it sent this tingling down my arm. I haven't experienced that in a long time so I thought that was weird. \"    \"If I try to lift my arm up with the weighted blanket on, it's difficult. I can do it, and if I continue to do it, it loosens it. \"     Objective:     Pain/Symptom Presentation:   Pain at rest: 0/10  Pain at worst: 2-3/10    Shoulder Special Tests:  Abe Test/Empty Can: (R) (-), (L) (-)  Shoulder Shrug Sign: (R) (-), (L) (-)  Rotator Cuff:   Painful Arc Sign: (R) (-), (L) (-)  Drop Arm Sign (full thickness): (R) (-), (L) (-)  Abe Test/Empty Can (supra): (R) (-), (L) (-)  Biceps Tendon:   Speed's Test: (R) (-), (L) (-)  Dynamic Speed's Test: (R) (-), (L) (-)    HEP: Patient was issued a HEP handout 12/1/23 including self-massage with TPR to shoulder, 2-way half-kneeling thoracic rotation, S/L flexion, elastic band (B) ER with OH press, and elastic band face pulls. Treatment:    Therapeutic Exercise: x 10min  Re-assessment of patient's pain and symptom presentation  Bicep curl: 2 x 10 (B), 6# DB  Lateral raises:   Elastic band face pulls: 2 x 10 (B), red theraband   90-90 elastic band IR and ER: add next session  HEP update: Reviewed new HEP handout with new exercises and progressions, provided verbal and written instructions/cueing for proper technique and common errors/compensations as needed. Reviewed the importance of compliance with home exercise program to facilitate recovery and meet long-term goals. Neuromuscular Re-education: x 15min  Rhythmic stabilization - supine with arm at 90deg flex: 2 x 30\" (R)     Elastic band (B) ER with OH press: 2 x 8 (B), yellow theraband   Elastic band lateral UE wall walk: 1 laps  x 15ft, yellow theraband   Betty elastic band pull apart with forward flexion: 2 x 10 (B), green theraband      Manual Therapy: x 20min  Soft tissue mobilization (supine): (R) biceps, teres, lats  GH posterior joint mobs: Grade 3, 4 x 10\" (R)   GH inferior joint mobs: Grade 3, 4 x 10\" (R)   Trigger point release: (R) biceps  Soft tissue mobilization (left sidelying): (R) supra, infra, mid and low trap, paraspinals    Provider Interactions With Patient:   Verbal and manual cueing on proper performance of the prescribed exercises. Assessment: Pam Curry is with pain when she abducts her arm with her elbow bent. She is able to abduct her arm with her both IR and ER without limitations. She reports pain over the deltoid tuberosity which she was educated can be a referral pattern for rotator cuff involvement, though her strength doesn't suggest any rotator cuff tear. She denies any TTP over the biceps musculature and biceps strength testing doesn't reproduce any symptoms. We continue to work on shoulder stabilization in therapy and updated the HEP. Goals:   Short-Term Goals:  Patient will improve thoracic spine extension and rotation mobility to facilitate full overhead reaching mobility for overhead reaching. Timeframe: 3 weeks. Long-Term Goals: The patient will improve combined shoulder internal rotation, extension, and adduction AROM with scapular retraction and thoracic extension mobility to facilitate reaching behind the back to T8 level for self-care and dressing activities. Timeframe: 4-6 weeks.   Patient will improve shoulder pain and mobility to facilitate periodically lying on the involved side while sleeping without complaints of pain or stiffness upon waking. Timeframe: 4-6 weeks. Patient will improve shoulder mobility to facilitate intermittent reaching across his/her body for regularly for self-care and dressing activities. Timeframe: 4-6 weeks. The patient will improve shoulder mobility and scapular stabilization strength to facilitate return to recreational resistance training for general health/fitness activities. Timeframe: 6 weeks. Plan: Follow up on tolerance to updated HEP. Charges:  Therex x 1, MT x 1, NMR x 1       Total Timed Treatment: 45min    Total Treatment Time: 45min

## 2023-12-04 ENCOUNTER — OFFICE VISIT (OUTPATIENT)
Dept: PHYSICAL THERAPY | Age: 53
End: 2023-12-04
Attending: INTERNAL MEDICINE
Payer: COMMERCIAL

## 2023-12-04 PROCEDURE — 97110 THERAPEUTIC EXERCISES: CPT

## 2023-12-04 PROCEDURE — 97140 MANUAL THERAPY 1/> REGIONS: CPT

## 2023-12-04 NOTE — PATIENT INSTRUCTIONS
Thank you for coming to your physical therapy appt! During your appt today you were issued a HEP handout from [employer] which can also be accessed using the information below. The exercises chosen are meant to supplement the treatment you received and reinforce the progress made in physical therapy. It is important to stay consistent with your exercises to help facilitate and maximize your recovery! View at [email or web address] using code: [email or web address]

## 2023-12-04 NOTE — PROGRESS NOTES
Date of Service: 12/4/2023  Dx: Muscle strain of right shoulder, subsequent encounter (J24.389X)              Insurance: [employer] LABOR FUND PPO  Insurance Limits: N/A  Visit #: 6   Authorized # of Visits: 8 recommended   POC/Auth Expiration: N/A  Date of Last PN: 10/16/23 (Visit #1)  Authorizing Physician/Provider: Elgin Nayak MD  Next MD visit: None Scheduled  Fall Risk: Standard         Precautions: None            Subjective: \"My shoulder is fine. I worked two days over the weekend. It never stops me at work or from working out, but dependent on what position and what I'm doing, if I try to reach into the back seat, it stops me almost from certain range of motion. It feels like there's something inside the muscle that's tightening that I have readjust before I can pull my arm back to normal.\" She reports difficulty reaching forward after reaching back. The patient reports pain over the anterior and lateral upper arm, not in her shoulder. The patient denies any shifting or locking episodes in her shoulder, but does report clicking in her shoulder. \"If I'm getting dressed and I reach overhead, it's okay, but if I have to reach forward, I have grab the muscle and re-adjust it. \"     Objective:     Pain/Symptom Presentation:   Pain at rest: 0/10  Pain at worst: 2-3/10    HEP: Patient was issued a HEP handout 12/1/23 including self-massage with TPR to shoulder, 2-way half-kneeling thoracic rotation, S/L flexion, elastic band (B) ER with OH press, and elastic band face pulls.      Treatment:    Therapeutic Exercise: x 17min  PROM - shoulder ext stretch: x 10 (R)   PROM - shoulder ext with IR: x 10 (R)   PROM - 90-90 shoulder ER/ABD: x 10 (R)   TRX shoulder ext stretch: 1 x 10 x 3\"   Doorway pec stretch: 2 x 30\"   90-90 elastic band IR and ER: x 20 (R), red theraband   Cane/wand ext: 1 x 10 (B)   HEP update: Reviewed new HEP handout with new exercises and progressions, provided verbal and written instructions/cueing for proper technique and common errors/compensations as needed. Reviewed the importance of compliance with home exercise program to facilitate recovery and meet long-term goals. Neuromuscular Re-education: x 3min  KB arm bar: 2 x 15 (R), 10# KB       Manual Therapy: x 20min  Soft tissue mobilization (supine): (R) biceps, teres, lats  GH posterior joint mobs: Grade 3, 4 x 10\" (R)   GH inferior joint mobs: Grade 3, 4 x 10\" (R)   Trigger point release: (R) biceps  Soft tissue mobilization (left sidelying): (R) supra, infra, mid and low trap, paraspinals    Provider Interactions With Patient:   Provided patient with a written copy of exercise instruction which was also documented in patient's electronic medical chart. Assessment: Kandy Wood arrives today complaining predominantly over pain over the deltoid tuberosity area when trying to reach forward after reaching all behind her into horiz ABD and/or ext. She reports clicking in her shoulder, but noted bilaterally. The patient is still not TTP over the area of her pain, suggesting it may be referred pain pattern. The patient was recommended to trial physical therapy treatment but if her pain continues to follow back up with her MD. While her symptoms are mild and she is able to do most activities, she expresses concern about her remaining symptoms. We tried adding exercises for additional extension-based stretching. The patient reports improved symptoms afterward, but notes that the relief is usually only temporary. We updated the patient's HEP to include additional extension-based stretching, hoping that additional frequency will help with pain management. Goals:   Short-Term Goals:  Patient will improve thoracic spine extension and rotation mobility to facilitate full overhead reaching mobility for overhead reaching. Timeframe: 3 weeks. Long-Term Goals:   The patient will improve combined shoulder internal rotation, extension, and adduction AROM with scapular retraction and thoracic extension mobility to facilitate reaching behind the back to T8 level for self-care and dressing activities. Timeframe: 4-6 weeks. Patient will improve shoulder pain and mobility to facilitate periodically lying on the involved side while sleeping without complaints of pain or stiffness upon waking. Timeframe: 4-6 weeks. Patient will improve shoulder mobility to facilitate intermittent reaching across his/her body for regularly for self-care and dressing activities. Timeframe: 4-6 weeks. The patient will improve shoulder mobility and scapular stabilization strength to facilitate return to recreational resistance training for general health/fitness activities. Timeframe: 6 weeks. Plan: Follow up on tolerance to updated HEP. Charges:  Therex x 1, MT x 2      Total Timed Treatment: 40min    Total Treatment Time: 40min

## 2023-12-08 ENCOUNTER — PATIENT MESSAGE (OUTPATIENT)
Dept: INTERNAL MEDICINE CLINIC | Facility: CLINIC | Age: 53
End: 2023-12-08

## 2023-12-08 ENCOUNTER — OFFICE VISIT (OUTPATIENT)
Dept: PHYSICAL THERAPY | Age: 53
End: 2023-12-08
Attending: INTERNAL MEDICINE
Payer: COMMERCIAL

## 2023-12-08 DIAGNOSIS — S46.911A MUSCLE STRAIN OF RIGHT SHOULDER, INITIAL ENCOUNTER: Primary | ICD-10-CM

## 2023-12-08 PROCEDURE — 97110 THERAPEUTIC EXERCISES: CPT

## 2023-12-08 PROCEDURE — 97140 MANUAL THERAPY 1/> REGIONS: CPT

## 2023-12-08 NOTE — PROGRESS NOTES
PHYSICAL THERAPY DISCHARGE:     Date of Service: 12/8/2023  Dx: Muscle strain of right shoulder, subsequent encounter (I57.384X)              Insurance: [employer]  Insurance Limits: N/A  Visit #: 7   Authorized # of Visits: 8 recommended   POC/Auth Expiration: N/A  Date of Last PN: 10/16/23 (Visit #1)  Authorizing Physician/Provider: Laurie Sheriff MD  Next MD visit: None Scheduled  Fall Risk: Standard         Precautions: None            Subjective: \"The day that I left I was a little sore where you massaged it. \" The patient is still reporting a catching in her shoulder when she tries to pull her arm forward. \"It doesn't lock, but I have to readjust to move my arm forward. \" She localizes the pain still over the deltoid tuberosity. The patient reports that if she puts pressure on the deltoid tuberosity, she is able to reach forward without an issue. The patient reports that she is able to complete all her occupational demands without an issue. She also reports that she was able to workout without disruption. Her primary complaint is pain with coming forward after reaching back behind her. Objective:     Pain/Symptom Presentation:   Pain at rest: 0/10  Pain at worst: 2-3/10    HEP: Patient was issued a HEP handout 12/1/23 including self-massage with TPR to shoulder, 2-way half-kneeling thoracic rotation, S/L flexion, elastic band (B) ER with OH press, and elastic band face pulls. Treatment:    Therapeutic Exercise: x 10min  Patient education: reviewed symptoms and results of provocative testing.       Manual Therapy: x 20min  Soft tissue mobilization (supine): (R) biceps, teres, lats  GH posterior joint mobs: Grade 3, 4 x 10\" (R)   GH inferior joint mobs: Grade 3, 4 x 10\" (R)   Trigger point release: (R) biceps   Soft tissue mobilization (left sidelying): (R) supra, infra, mid and low trap, paraspinals    Provider Interactions With Patient:   Advised patient to follow up with MD for additional imaging. Assessment: Xochilt Harirs continues to complain of catching and locking at her shoulder. Her primary complaint is pain upon the return from an end-range shoulder ext and horiz ABD position reaching behind her. The patient reports that this is alleviated by putting pressure on the deltoid tuberosity. She reports improved ability to perform this movement after STM to the deltoid, but it's only temporary and returns after a period of rest and/or inactivity. She also reports pain with abduction when her elbow is bent, but is able to perform it with her elbow straight without limitations. Strength testing to ABD with elbow bent versus straight does not elicit pain or symptoms. Provocative testing for labral involvement is negative and she doesn't show any strength deficits to suggest rotator cuff tear. The patient reports that her pain was predominantly over the anterior shoulder at onset, but is without TTP over the biceps musculature and doesn't have any pain or weakness with strength testing at her biceps. She is also without pain or weakness when strength testing the deltoid musculature. The patient has been educated at length about the possibility of referred pain from her shoulder, but she believes the pathology is in her arm versus her shoulder joint. She reports pain with reaching behind her and reaching across, but is able to reach overhead without pain or discomfort. At this time, due to lack of progress in therapy, I recommend the patient follow back up with her MD for additional imaging and/or testing. Goals:   Short-Term Goals:  Patient will improve thoracic spine extension and rotation mobility to facilitate full overhead reaching mobility for overhead reaching. Timeframe: 3 weeks. (NOT MET 12/8/23)  Long-Term Goals:   The patient will improve combined shoulder internal rotation, extension, and adduction AROM with scapular retraction and thoracic extension mobility to facilitate reaching behind the back to T8 level for self-care and dressing activities. Timeframe: 4-6 weeks. (NOT MET 12/8/23)  Patient will improve shoulder pain and mobility to facilitate periodically lying on the involved side while sleeping without complaints of pain or stiffness upon waking. Timeframe: 4-6 weeks. (NOT MET 12/8/23)  Patient will improve shoulder mobility to facilitate intermittent reaching across his/her body for regularly for self-care and dressing activities. Timeframe: 4-6 weeks. (NOT MET 12/8/23)  The patient will improve shoulder mobility and scapular stabilization strength to facilitate return to recreational resistance training for general health/fitness activities. Timeframe: 6 weeks. (NOT MET 12/8/23)    Plan: Patient will be discharged from therapy to follow up with MD.        Charges:  Therex x 1, MT x 1      Total Timed Treatment: 30min    Total Treatment Time: 30min

## 2023-12-12 NOTE — TELEPHONE ENCOUNTER
From: Adri Deluca  To: Inez Forte  Sent: 12/8/2023 10:53 AM CST  Subject: Imaging needed     Good morning   I completed 8 sessions of physical therapy as instructed and I'm still having the same discomfort in my shoulder and arm. My therapist said I should get a MRI to get the answer to the discomfort that I'm feeling. Please advise.    Thank you

## 2023-12-13 NOTE — TELEPHONE ENCOUNTER
I feel that patient will benefit from the evaluation by orthopedist at this time. They can do better assessment and order MRI if needed. They also could order some other treatments, like injections. Please provide referral to the patient and notify her about it. She is also due for physical exam with Dr. Vasiliy Merino. Thanks.

## 2023-12-18 ENCOUNTER — OFFICE VISIT (OUTPATIENT)
Dept: ORTHOPEDICS CLINIC | Facility: CLINIC | Age: 53
End: 2023-12-18
Payer: COMMERCIAL

## 2023-12-18 ENCOUNTER — HOSPITAL ENCOUNTER (OUTPATIENT)
Dept: GENERAL RADIOLOGY | Age: 53
Discharge: HOME | End: 2023-12-18
Attending: FAMILY MEDICINE
Payer: COMMERCIAL

## 2023-12-18 ENCOUNTER — TELEPHONE (OUTPATIENT)
Dept: ORTHOPEDICS CLINIC | Facility: CLINIC | Age: 53
End: 2023-12-18

## 2023-12-18 VITALS — WEIGHT: 184 LBS | HEIGHT: 65 IN | BODY MASS INDEX: 30.66 KG/M2

## 2023-12-18 DIAGNOSIS — M79.601 RIGHT ARM PAIN: ICD-10-CM

## 2023-12-18 DIAGNOSIS — G89.29 CHRONIC RIGHT SHOULDER PAIN: ICD-10-CM

## 2023-12-18 DIAGNOSIS — M79.601 RIGHT ARM PAIN: Primary | ICD-10-CM

## 2023-12-18 DIAGNOSIS — M75.51 SUBACROMIAL BURSITIS OF RIGHT SHOULDER JOINT: Primary | ICD-10-CM

## 2023-12-18 DIAGNOSIS — M25.511 CHRONIC RIGHT SHOULDER PAIN: ICD-10-CM

## 2023-12-18 PROCEDURE — 3008F BODY MASS INDEX DOCD: CPT | Performed by: FAMILY MEDICINE

## 2023-12-18 PROCEDURE — 73060 X-RAY EXAM OF HUMERUS: CPT | Performed by: FAMILY MEDICINE

## 2023-12-18 PROCEDURE — 99204 OFFICE O/P NEW MOD 45 MIN: CPT | Performed by: FAMILY MEDICINE

## 2023-12-18 RX ORDER — DICLOFENAC SODIUM 75 MG/1
75 TABLET, DELAYED RELEASE ORAL 2 TIMES DAILY
Qty: 28 TABLET | Refills: 1 | Status: SHIPPED | OUTPATIENT
Start: 2023-12-18

## 2023-12-21 ENCOUNTER — OFFICE VISIT (OUTPATIENT)
Dept: ORTHOPEDICS CLINIC | Facility: CLINIC | Age: 53
End: 2023-12-21
Payer: COMMERCIAL

## 2023-12-21 DIAGNOSIS — M75.51 SUBACROMIAL BURSITIS OF RIGHT SHOULDER JOINT: Primary | ICD-10-CM

## 2023-12-21 DIAGNOSIS — G89.29 CHRONIC RIGHT SHOULDER PAIN: ICD-10-CM

## 2023-12-21 DIAGNOSIS — M25.511 CHRONIC RIGHT SHOULDER PAIN: ICD-10-CM

## 2023-12-21 PROCEDURE — 20611 DRAIN/INJ JOINT/BURSA W/US: CPT | Performed by: FAMILY MEDICINE

## 2023-12-21 PROCEDURE — 99214 OFFICE O/P EST MOD 30 MIN: CPT | Performed by: FAMILY MEDICINE

## 2023-12-21 RX ORDER — KETOROLAC TROMETHAMINE 30 MG/ML
30 INJECTION, SOLUTION INTRAMUSCULAR; INTRAVENOUS ONCE
Status: COMPLETED | OUTPATIENT
Start: 2023-12-21 | End: 2023-12-21

## 2023-12-21 RX ORDER — TRIAMCINOLONE ACETONIDE 40 MG/ML
40 INJECTION, SUSPENSION INTRA-ARTICULAR; INTRAMUSCULAR ONCE
Status: COMPLETED | OUTPATIENT
Start: 2023-12-21 | End: 2023-12-21

## 2023-12-21 RX ADMIN — TRIAMCINOLONE ACETONIDE 40 MG: 40 INJECTION, SUSPENSION INTRA-ARTICULAR; INTRAMUSCULAR at 09:41:00

## 2023-12-21 RX ADMIN — KETOROLAC TROMETHAMINE 30 MG: 30 INJECTION, SOLUTION INTRAMUSCULAR; INTRAVENOUS at 09:41:00

## 2023-12-21 NOTE — PROGRESS NOTES
Clinic Note [facility]     Subjective:    CC: Follow-up for right shoulder pain    Interval HPI: The patient is a 31-year-old right-hand dominant female, returning for follow-up regarding her right shoulder pain. She reports some improvement in pain following the initiation of Diclofenac but continues to experience similar symptoms as previously noted. She describes the pain as unchanged in quality or character. 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 She reports no new symptoms or changes since the last visit. Objective:    Physical Exam    Constitutional: NAD. AOx3. Well-developed, well-nourished. Psychiatric: Normal mood and affect. Behavior, judgment, and thought content are normal.    Focused Musculoskeletal Exam of Right Shoulder    Inspection: No changes noted from the last examination. No gross deformity, asymmetry, or muscle atrophy. Palpation: Persistent tenderness in the subacromial area, no bony tenderness. No tenderness at [facility] joint, biceps tendon. ROM: No changes from the last examination. AROM and PROM are as previously noted. Neurovascular: SILT in nerve distributions, normal muscle firing, 2+ radial pulse, brisk capillary refill. Special Tests: All results consistent with the previous examination. Imaging: No new imaging performed during this visit. Assessment:    Subacromial impingement, right shoulder  Pain in right shoulder - ongoing    Plan:    Continue Diclofenac 75mg BID. Physical therapy to be continued with emphasis on strengthening and ROM exercises. Subacromial corticosteroid injection performed today with US - monitor response and symptoms. Advise the patient to continue avoiding heavy lifting and overhead activities.   Deferred MRI of the right shoulder - to reconsider based on response to injection and ongoing symptoms. Tentative follow-up in 2-4 weeks to assess response to injection and ongoing management. Procedure Note for US-guided Subacromial Injection:    After discussion of the risks and benefits, the patient elected to proceed with a therapeutic injection into the right subacromial bursa under US guidance. Confirmed that the patient does not have history of prior adverse reactions, active infections, or relevant allergies. There was no erythema or warmth, and the skin was clear. The skin was sterilized with ChloraPrep. A 22 gauge needle was inserted via lateral approach utilizing US for needle guidance and placement. The site was injected with a mixture of 1 mL of kenalog 40 mg/mL, 1 mL of Toradol 30 mg/mL and 1 mL of 1% Lidocaine without Epinephrine. The injection was completed without complication, and a bandage was applied. The patient tolerated the procedure well and was instructed to avoid strenuous activity for the next 24-48 hours and to use ice or Tylenol for pain as needed. The patient will call immediately with any signs of infection or allergic reaction. POST-INJECTION CARE: The patient tolerated the procedure well. An occlusive bandage was placed over the injection site. Post-injection care instructions provided to the patient. The patient was asked to avoid strenuous activity and continue to rest the area for 2-3 days before resuming regular activities. Patient advised that the area may be more painful for the first 1-2 days. They can use ice or Tylenol for pain as needed. Patient was instructed to watch for fever, increased swelling, or persistent pain. The patient will call immediately with any signs of infection or allergic reaction. COMPLICATIONS: The patient tolerated the procedure well without any complications.     Etelvina Navarrete DO, BROOKS   Primary Care Sports Medicine  Department of Orthopaedic Surgery  [facility]    [address]   [address] [facility], [place], [address] [facility]    t: [phone]  f: [phone]      [facility]. org

## 2024-02-29 ENCOUNTER — TELEPHONE (OUTPATIENT)
Dept: INTERNAL MEDICINE CLINIC | Facility: CLINIC | Age: 54
End: 2024-02-29

## 2024-04-02 ENCOUNTER — TELEPHONE (OUTPATIENT)
Dept: INTERNAL MEDICINE CLINIC | Facility: CLINIC | Age: 54
End: 2024-04-02

## 2024-08-30 ENCOUNTER — OFFICE VISIT (OUTPATIENT)
Dept: INTERNAL MEDICINE CLINIC | Facility: CLINIC | Age: 54
End: 2024-08-30
Payer: COMMERCIAL

## 2024-08-30 VITALS
HEART RATE: 72 BPM | RESPIRATION RATE: 12 BRPM | TEMPERATURE: 98 F | WEIGHT: 184.88 LBS | HEIGHT: 64.5 IN | SYSTOLIC BLOOD PRESSURE: 90 MMHG | DIASTOLIC BLOOD PRESSURE: 64 MMHG | BODY MASS INDEX: 31.18 KG/M2

## 2024-08-30 DIAGNOSIS — Z12.11 SCREENING FOR COLON CANCER: ICD-10-CM

## 2024-08-30 DIAGNOSIS — Z00.00 PHYSICAL EXAM, ANNUAL: Primary | ICD-10-CM

## 2024-08-30 DIAGNOSIS — Z12.31 ENCOUNTER FOR SCREENING MAMMOGRAM FOR MALIGNANT NEOPLASM OF BREAST: ICD-10-CM

## 2024-08-30 PROBLEM — D56.3 BETA THALASSEMIA TRAIT: Status: ACTIVE | Noted: 2024-08-30

## 2024-08-30 NOTE — PROGRESS NOTES
[facility]    CHIEF COMPLAINT:   Chief Complaint   Patient presents with    Routine Physical     Wants recommendation for gyne. Last pap approx 5 years ago. Last mammo approx 4 years ago.  No colon    Immunization/Injection     Pt states she is current with tetanus shot. Will send update.          HPI:   Kalani Monaco is a 54 year old female who presents for a complete physical exam. Symptoms: denies discharge, itching, burning or dysuria.     Due for pap, mammogram. She declines pap today. Wants to see gyne. Given names.   Mammo ordered.   Due for colonoscopy. Given fit kit and referral for gi today.     Due tdap, shingrix, covid booster.     Exercise: gym twice a week    Derm: no skin concerns.     She has beta thalassemia trait.     Wt Readings from Last 6 Encounters:   08/30/24 184 lb 14.4 oz (83.9 kg)   12/18/23 184 lb (83.5 kg)   09/06/23 184 lb 9.6 oz (83.7 kg)   09/09/22 182 lb (82.6 kg)   06/09/20 182 lb 12.8 oz (82.9 kg)   01/21/20 179 lb (81.2 kg)     Body mass index is 31.25 kg/m².       Current Outpatient Medications   Medication Sig Dispense Refill    diclofenac 75 MG Oral Tab EC Take 1 tablet (75 mg total) by mouth 2 (two) times daily. (Patient taking differently: Take 1 tablet (75 mg total) by mouth 2 (two) times daily. Takes PRN) 28 tablet 1      History reviewed. No pertinent past medical history.   History reviewed. No pertinent surgical history.   Family History   Problem Relation Age of Onset    Heart Disorder Mother     Hypertension Mother     Other (heart failure) Mother 76    Dementia Maternal Grandfather       Social History:   Social History     Socioeconomic History    Marital status:    Occupational History    Occupation: nurse   Tobacco Use    Smoking status: Never     Passive exposure: Never    Smokeless tobacco: Never   Vaping Use    Vaping status: Never Used   Substance and Sexual Activity    Alcohol use: Yes     Comment: approx 2 glasses of wine weekly    Drug  use: Never   Other Topics Concern    Exercise Yes     Comment: 4 times a week [employer]     Occ: RN in ER. : yes.    Exercise: exercises regularly.  Diet: watches minimally     REVIEW OF SYSTEMS:   GENERAL: feels well otherwise  SKIN: denies any unusual skin lesions  EYES:denies blurred vision or double vision  HEENT: denies nasal congestion, sinus pain or ST  LUNGS: denies shortness of breath with exertion  CARDIOVASCULAR: denies chest pain on exertion  GI: denies abdominal pain,denies heartburn  : denies dysuria, vaginal discharge or itching  MUSCULOSKELETAL: denies back pain  NEURO: denies headaches  PSYCHE: denies depression or anxiety  HEMATOLOGIC: denies hx of anemia  ENDOCRINE: denies thyroid history  ALL/ASTHMA: denies hx of allergy or asthma    EXAM:   BP 90/64 (BP Location: Right arm, Patient Position: Sitting, Cuff Size: large)   Pulse 72   Temp 97.5 °F (36.4 °C) (Temporal)   Resp 12   Ht 5' 4.5\" (1.638 m)   Wt 184 lb 14.4 oz (83.9 kg)   Breastfeeding No   BMI 31.25 kg/m²   Body mass index is 31.25 kg/m².   GENERAL: well developed, well nourished,in no apparent distress  SKIN: no rashes,no suspicious lesions  HEENT: atraumatic, normocephalic,ears and throat are clear  EYES:PERRLA, conjunctiva are clear  NECK: supple,no adenopathy,no bruits  CHEST: no chest tenderness  LUNGS: clear to auscultation  CARDIO: nl s1 and s2, RRR without murmur  GI: good BS's,no masses, HSM or tenderness  BREAST: Deferred. To be done at gyne.    GENITAL/URINARY:  Deferred. To be done at gyne.    MUSCULOSKELETAL: back is not tender,FROM of the back  EXTREMITIES: no cyanosis, clubbing or edema  NEURO: Oriented times three,cranial nerves are intact,motor and sensory are grossly intact    Labs:   Lab Results   Component Value Date/Time    WBC 10.9 06/09/2020 01:16 PM    HGB 12.6 06/09/2020 01:16 PM    .0 06/09/2020 01:16 PM      Lab Results   Component Value Date/Time    GLU 86 06/09/2020 01:16 PM      06/09/2020 01:16 PM    K 3.9 06/09/2020 01:16 PM     06/09/2020 01:16 PM    CO2 25.0 06/09/2020 01:16 PM    CREATSERUM 0.81 06/09/2020 01:16 PM    CA 8.8 06/09/2020 01:16 PM    ALB 4.0 06/09/2020 01:16 PM    TP 7.8 06/09/2020 01:16 PM    ALKPHO 68 06/09/2020 01:16 PM    AST 22 06/09/2020 01:16 PM    ALT 21 06/09/2020 01:16 PM    BILT 0.5 06/09/2020 01:16 PM    TSH 0.903 06/09/2020 01:16 PM        Lab Results   Component Value Date/Time    CHOLEST 153 12/12/2019 12:00 AM    HDL 53 12/12/2019 12:00 AM    TRIG 86 12/12/2019 12:00 AM       No results found for: \"A1C\"   Vitamin D:    No results found for: \"VITD\"        ASSESSMENT AND PLAN:   Kalani Monaco is a 54 year old female who presents for a complete physical exam.   1. Physical exam, annual  Do labs.   See gyne.   Do mammo.   Do colonoscopy.   Immunizations discussed. She thinks she is up to date on the tdap and will update the chart with records.   - CBC With Differential With Platelet; Future  - Comp Metabolic Panel; Future  - Lipid Panel; Future  - TSH W Reflex To Free T4; Future    2. Encounter for screening mammogram for malignant neoplasm of breast  - [facility] JOSEPH 2D+3D SCREENING BILAT (CPT=77067/73308); Future    3. Screening for colon cancer  - EVALUATE & TREAT, GASTRO (INTERNAL)  - Occult Blood, Fecal, Immunoassay (Blue cards) [E]; Future        Return in about 1 year (around 8/30/2025) for physical.      Marissa Pollack MD

## 2024-10-02 ENCOUNTER — LAB ENCOUNTER (OUTPATIENT)
Dept: LAB | Age: 54
End: 2024-10-02
Attending: INTERNAL MEDICINE
Payer: COMMERCIAL

## 2024-10-02 DIAGNOSIS — Z00.00 PHYSICAL EXAM, ANNUAL: ICD-10-CM

## 2024-10-02 LAB
ALBUMIN SERPL-MCNC: 4.4 G/DL (ref 3.2–4.8)
ALBUMIN/GLOB SERPL: 1.5 {RATIO} (ref 1–2)
ALP LIVER SERPL-CCNC: 77 U/L
ALT SERPL-CCNC: 18 U/L
ANION GAP SERPL CALC-SCNC: 5 MMOL/L (ref 0–18)
AST SERPL-CCNC: 17 U/L (ref ?–34)
BASOPHILS # BLD AUTO: 0.06 X10(3) UL (ref 0–0.2)
BASOPHILS NFR BLD AUTO: 0.6 %
BILIRUB SERPL-MCNC: 0.3 MG/DL (ref 0.3–1.2)
BUN BLD-MCNC: 15 MG/DL (ref 9–23)
CALCIUM BLD-MCNC: 10.1 MG/DL (ref 8.7–10.4)
CHLORIDE SERPL-SCNC: 108 MMOL/L (ref 98–112)
CHOLEST SERPL-MCNC: 139 MG/DL (ref ?–200)
CO2 SERPL-SCNC: 28 MMOL/L (ref 21–32)
CREAT BLD-MCNC: 0.81 MG/DL
EGFRCR SERPLBLD CKD-EPI 2021: 87 ML/MIN/1.73M2 (ref 60–?)
EOSINOPHIL # BLD AUTO: 0.24 X10(3) UL (ref 0–0.7)
EOSINOPHIL NFR BLD AUTO: 2.3 %
ERYTHROCYTE [DISTWIDTH] IN BLOOD BY AUTOMATED COUNT: 17 %
FASTING PATIENT LIPID ANSWER: YES
FASTING STATUS PATIENT QL REPORTED: YES
GLOBULIN PLAS-MCNC: 2.9 G/DL (ref 2–3.5)
GLUCOSE BLD-MCNC: 92 MG/DL (ref 70–99)
HCT VFR BLD AUTO: 38.7 %
HDLC SERPL-MCNC: 50 MG/DL (ref 40–59)
HGB BLD-MCNC: 12.1 G/DL
IMM GRANULOCYTES # BLD AUTO: 0.03 X10(3) UL (ref 0–1)
IMM GRANULOCYTES NFR BLD: 0.3 %
LDLC SERPL CALC-MCNC: 75 MG/DL (ref ?–100)
LYMPHOCYTES # BLD AUTO: 4.45 X10(3) UL (ref 1–4)
LYMPHOCYTES NFR BLD AUTO: 42.4 %
MCH RBC QN AUTO: 23.7 PG (ref 26–34)
MCHC RBC AUTO-ENTMCNC: 31.3 G/DL (ref 31–37)
MCV RBC AUTO: 75.7 FL
MONOCYTES # BLD AUTO: 0.57 X10(3) UL (ref 0.1–1)
MONOCYTES NFR BLD AUTO: 5.4 %
NEUTROPHILS # BLD AUTO: 5.14 X10 (3) UL (ref 1.5–7.7)
NEUTROPHILS # BLD AUTO: 5.14 X10(3) UL (ref 1.5–7.7)
NEUTROPHILS NFR BLD AUTO: 49 %
NONHDLC SERPL-MCNC: 89 MG/DL (ref ?–130)
OSMOLALITY SERPL CALC.SUM OF ELEC: 292 MOSM/KG (ref 275–295)
PLATELET # BLD AUTO: 374 10(3)UL (ref 150–450)
POTASSIUM SERPL-SCNC: 3.9 MMOL/L (ref 3.5–5.1)
PROT SERPL-MCNC: 7.3 G/DL (ref 5.7–8.2)
RBC # BLD AUTO: 5.11 X10(6)UL
SODIUM SERPL-SCNC: 141 MMOL/L (ref 136–145)
TRIGL SERPL-MCNC: 68 MG/DL (ref 30–149)
TSI SER-ACNC: 1.11 MIU/ML (ref 0.55–4.78)
VLDLC SERPL CALC-MCNC: 10 MG/DL (ref 0–30)
WBC # BLD AUTO: 10.5 X10(3) UL (ref 4–11)

## 2024-10-02 PROCEDURE — 80050 GENERAL HEALTH PANEL: CPT | Performed by: INTERNAL MEDICINE

## 2024-10-02 PROCEDURE — 80061 LIPID PANEL: CPT | Performed by: INTERNAL MEDICINE

## 2024-11-22 ENCOUNTER — PATIENT MESSAGE (OUTPATIENT)
Dept: INTERNAL MEDICINE CLINIC | Facility: CLINIC | Age: 54
End: 2024-11-22

## 2024-11-22 NOTE — TELEPHONE ENCOUNTER
Signed. Please make sure there is no change to pt's health since her visit with me in 8/2024 prior to giving her the signed form.

## 2024-11-25 NOTE — TELEPHONE ENCOUNTER
Form completed. Patient notified. Patient verbalized understanding   Pt will pick it up. Form ready at the  for pt.

## 2024-12-02 ENCOUNTER — MED REC SCAN ONLY (OUTPATIENT)
Dept: INTERNAL MEDICINE CLINIC | Facility: CLINIC | Age: 54
End: 2024-12-02

## 2025-08-04 ENCOUNTER — TELEPHONE (OUTPATIENT)
Dept: INTERNAL MEDICINE CLINIC | Facility: CLINIC | Age: 55
End: 2025-08-04

## (undated) NOTE — LETTER
05/19/22 5/19/2022      [address] [place] [address] [address] [place] [address]    4/2/1970      Dear Jeromy Musa,      After careful review of your medical record it has come to our attention that you are due for an Annual Physical, Annual Mammogram, PAP Smear and Colon Cancer Screening      We take each of our patient's health very seriously and the key to maintaining your health is to routinely follow-up as planned with your providers. Please contact our office as soon as possible to schedule an appointment at [phone]. If you've activated your [employer] account, you also have access to self-schedule online as well. If you have any questions, please do not hesitate to contact our office directly at [phone].       Thank you,    Dr. Bernice Duncan MD  [address] [place] [address]  Phone: [phone]  Fax: [phone]

## (undated) NOTE — LETTER
4/2/2024      Kalani Monaco  [address] [facility] [address]    4/2/1970      Dear Kalani Monaco,      After careful review of your medical record it has come to our attention that you are overdue for a physical, mammogram, pap smear, and colorectal cancer screening.     We take each of our patient's health very seriously and the key to maintaining your health is to routinely follow-up as planned with your providers.    Please contact our office as soon as possible to schedule an appointment at [phone].     If you have established care with another provider please contact our office to be removed from our system.       Thank you!        [address] [facility] [address] [place] [id number]  Phone: [phone]  Fax: [phone]